# Patient Record
Sex: FEMALE | Race: WHITE | NOT HISPANIC OR LATINO | Employment: STUDENT | URBAN - METROPOLITAN AREA
[De-identification: names, ages, dates, MRNs, and addresses within clinical notes are randomized per-mention and may not be internally consistent; named-entity substitution may affect disease eponyms.]

---
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
## 2021-02-28 ENCOUNTER — HOSPITAL ENCOUNTER (EMERGENCY)
Facility: HOSPITAL | Age: 21
Discharge: HOME | End: 2021-02-28
Attending: EMERGENCY MEDICINE | Admitting: EMERGENCY MEDICINE
Payer: COMMERCIAL

## 2021-02-28 VITALS
SYSTOLIC BLOOD PRESSURE: 123 MMHG | RESPIRATION RATE: 20 BRPM | WEIGHT: 120 LBS | HEART RATE: 122 BPM | TEMPERATURE: 97.4 F | HEIGHT: 64 IN | BODY MASS INDEX: 20.49 KG/M2 | DIASTOLIC BLOOD PRESSURE: 81 MMHG | OXYGEN SATURATION: 98 %

## 2021-02-28 DIAGNOSIS — K08.89 TOOTHACHE: ICD-10-CM

## 2021-02-28 DIAGNOSIS — K04.7 DENTAL INFECTION: Primary | ICD-10-CM

## 2021-02-28 PROCEDURE — 99284 EMERGENCY DEPT VISIT MOD MDM: CPT | Performed by: EMERGENCY MEDICINE

## 2021-02-28 PROCEDURE — 99282 EMERGENCY DEPT VISIT SF MDM: CPT

## 2021-02-28 RX ORDER — CLINDAMYCIN HYDROCHLORIDE 150 MG/1
300 CAPSULE ORAL EVERY 8 HOURS SCHEDULED
Qty: 42 CAPSULE | Refills: 0 | Status: SHIPPED | OUTPATIENT
Start: 2021-02-28 | End: 2021-03-07

## 2021-02-28 NOTE — ED PROVIDER NOTES
History  Chief Complaint   Patient presents with    Dental Pain     pt c/o lower right sided dental pain, pt states she feels some swelling on the right side of her face, pt believes its her wisdomt eeth     25 yo female who is generally healthy aside from prior WPW s/p ablation who presents to the ED c/o several days R lower posterior dental pain with associated localized R submandibular tender lymphadenopathy and mild trismus without fever, drooling, voice change or difficulty swallowing  States her wisdom teeth feel like they have been "hurting for a while" but notes worsened pain in the last few days which she thinks is from wisdom teeth  No hearing loss but pain radiates to area of R ear  No facial swelling  No fever or chills or sore throat  Additional history from mother at bedside  None       Past Medical History:   Diagnosis Date    Ian-Parkinson-White (WPW) syndrome        Past Surgical History:   Procedure Laterality Date    CARDIAC ELECTROPHYSIOLOGY MAPPING AND ABLATION         Family History   Problem Relation Age of Onset    Hypertension Mother      I have reviewed and agree with the history as documented  E-Cigarette/Vaping     E-Cigarette/Vaping Substances     Social History     Tobacco Use    Smoking status: Never Smoker    Smokeless tobacco: Never Used   Substance Use Topics    Alcohol use: Not on file    Drug use: Not on file       Review of Systems   HENT: Positive for dental problem and ear pain  Negative for congestion, drooling, ear discharge, facial swelling, hearing loss, mouth sores, sore throat, trouble swallowing and voice change  All other systems reviewed and are negative  Physical Exam  Physical Exam  Vitals signs and nursing note reviewed  Constitutional:       General: She is not in acute distress  Appearance: She is well-developed  She is not diaphoretic  HENT:      Head: Normocephalic and atraumatic  Nose: Nose normal  No congestion  Mouth/Throat:      Mouth: Mucous membranes are moist       Pharynx: Oropharynx is clear  No oropharyngeal exudate  Comments: Normal posterior oropharynx  Uvula midline  No trismus  No visible abscess  Focal swelling and tenderness posterior to R posterior molar  Eyes:      General:         Right eye: No discharge  Left eye: No discharge  Conjunctiva/sclera: Conjunctivae normal       Pupils: Pupils are equal, round, and reactive to light  Neck:      Musculoskeletal: Normal range of motion and neck supple  Vascular: No JVD  Comments: Focal R submandibular lymphadenopathy, otherwise no neck swelling or tenderness  Cardiovascular:      Rate and Rhythm: Normal rate  Pulses: Normal pulses  Pulmonary:      Breath sounds: No stridor  Musculoskeletal: Normal range of motion  General: No tenderness or deformity  Lymphadenopathy:      Cervical: Cervical adenopathy present  Skin:     General: Skin is warm and dry  Capillary Refill: Capillary refill takes less than 2 seconds  Neurological:      General: No focal deficit present  Mental Status: She is alert and oriented to person, place, and time  Cranial Nerves: No cranial nerve deficit  Sensory: No sensory deficit  Motor: No weakness or abnormal muscle tone        Coordination: Coordination normal          Vital Signs  ED Triage Vitals [02/28/21 1257]   Temperature Pulse Respirations Blood Pressure SpO2   (!) 97 4 °F (36 3 °C) (!) 122 20 123/81 98 %      Temp Source Heart Rate Source Patient Position - Orthostatic VS BP Location FiO2 (%)   Temporal Monitor Sitting Left arm --      Pain Score       7           Vitals:    02/28/21 1257   BP: 123/81   Pulse: (!) 122   Patient Position - Orthostatic VS: Sitting         Visual Acuity      ED Medications  Medications - No data to display    Diagnostic Studies  Results Reviewed     None                 No orders to display Procedures  Procedures         ED Course                                           MDM    Disposition  Final diagnoses:   Dental infection   Toothache     Time reflects when diagnosis was documented in both MDM as applicable and the Disposition within this note     Time User Action Codes Description Comment    2/28/2021  1:27 PM Rich Fraziert Add [K04 7] Dental infection     2/28/2021  1:27 PM Rich Oliver Add [K08 89] Toothache       ED Disposition     ED Disposition Condition Date/Time Comment    Discharge Stable Sun Feb 28, 2021  1:27 PM [facility] discharge to home/self care  Follow-up Information     Follow up With Specialties Details Why [address] [facility] [place]  In 3 days  Mejia Lopez [address] [facility] [address]          Discharge Medication List as of 2/28/2021  1:28 PM      START taking these medications    Details   clindamycin (CLEOCIN) 150 mg capsule Take 2 capsules (300 mg total) by mouth every 8 (eight) hours for 7 days, Starting Sun 2/28/2021, Until Sun 3/7/2021, Print           No discharge procedures on file      PDMP Review     None          ED Provider  Electronically Signed by           Ben Bradford MD  02/28/21 1762

## 2021-05-25 ENCOUNTER — OFFICE VISIT (OUTPATIENT)
Dept: OBGYN CLINIC | Facility: CLINIC | Age: 21
End: 2021-05-25
Payer: COMMERCIAL

## 2021-05-25 VITALS
WEIGHT: 109.2 LBS | HEIGHT: 64 IN | BODY MASS INDEX: 18.64 KG/M2 | SYSTOLIC BLOOD PRESSURE: 120 MMHG | DIASTOLIC BLOOD PRESSURE: 66 MMHG

## 2021-05-25 DIAGNOSIS — N92.3 INTERMENSTRUAL SPOTTING: ICD-10-CM

## 2021-05-25 DIAGNOSIS — N94.6 DYSMENORRHEA: Primary | ICD-10-CM

## 2021-05-25 PROBLEM — N92.0 INTERMENSTRUAL SPOTTING: Status: ACTIVE | Noted: 2021-05-25

## 2021-05-25 PROCEDURE — 99203 OFFICE O/P NEW LOW 30 MIN: CPT | Performed by: NURSE PRACTITIONER

## 2021-05-25 NOTE — PATIENT INSTRUCTIONS
Hysterectomy   WHAT YOU NEED TO KNOW:   What do I need to know about a hysterectomy? A hysterectomy is surgery to remove your uterus  Your ovaries, fallopian tubes, cervix, or part of your vagina may also need to be removed  The organs and tissue that will be removed depends on your medical condition  How do I prepare for a hysterectomy? Your healthcare provider will talk to you about how to prepare for surgery  You will need to stop taking aspirin 7 to 10 days before your procedure  You will need to stop taking NSAIDs 3 days before you procedure  You will also need to stop taking certain herbal supplements 7 days before your procedure  These include garlic, gingko biloba, and ginseng  Your provider may tell you to shower the night before your surgery  He or she may tell you to use a certain soap to help prevent a surgical site infection  Your provider may tell you not to eat or drink anything after midnight on the day of your surgery  He or she will tell you what medicines to take or not take on the day of your surgery  You will be given an antibiotic through your IV to help prevent a bacterial infection  Arrange for someone to drive you home and stay with you after surgery  What will happen during a hysterectomy? · You may be given general anesthesia to keep you asleep and free from pain during surgery  You may instead be given regional anesthesia to numb the lower part of your body  Your uterus may be removed through your vagina (vaginal hysterectomy) or through a an incision in your abdomen (abdominal hysterectomy)  It may also be done through several small incisions in your abdomen (laparoscopic hysterectomy)  During a laparoscopic hysterectomy, a laparoscope and other tools will be put into your abdomen through the small incisions  The laparoscope is a long metal tube with a light and camera on the end  Your abdomen will be filled with a gas   This allows your surgeon to see inside your abdomen more clearly  · Your surgeon will cut and tie the ligaments that hold your uterus in place  The blood vessels that go to your uterus will also be tied to help decrease bleeding  Your surgeon may also remove other organs or tissue such as your ovaries, fallopian tubes, cervix, lymph nodes, or part of your vagina  · Your surgeon may instead use robotic arms to place a laparoscope and other tools inside your abdomen through the incisions  He or she will use the machine to look inside your abdomen and guide the robotic arms  Your surgeon will use the tools attached to the robotic arms to remove your uterus, cervix, or other tissues  · Any incisions that were made during surgery will be closed with stitches, staples, surgical glue, or surgical tape  The incisions may be covered with a bandage  A vaginal pack or sanitary pad may be used to absorb the bleeding  A vaginal pack is a special gauze that is inserted into the vagina  It is removed before you go home or to a hospital room  What will happen after a hysterectomy? You may have a catheter to help drain your bladder for up to 24 hours  You may also have pain in your shoulders or near your ribs if gas was put in your abdomen  You will have pain for the first few days after surgery  You will need to wear sanitary pads for vaginal bleeding that occurs after surgery  You will be asked to walk as soon as possible after surgery  This will help to prevent blood clots in your legs  You may need to stay in the hospital for 1 to 4 days after surgery  The length of time depends on the type of hysterectomy you had  What are the risks of a hysterectomy? · The surgeon may need to change the type of surgery he or she was planning to do  For example, your surgeon may need to change from a laparoscopic surgery to an open abdominal surgery  You will not be able to become pregnant after you have a hysterectomy  You will go through menopause if your ovaries are removed  · You may bleed more than expected or get an infection  Your bladder, ureters, or bowels may be damaged during surgery  If your ureters were injured, you may need a catheter to drain your bladder for several days to weeks  You may get scar tissue in your abdomen that blocks your intestine or causes pelvic pain  If you have a hysterectomy to treat cancer, this surgery may not take it away completely  There is also a chance that the cancer may return  You may get a blood clot in your leg or arm  This may become life-threatening  CARE AGREEMENT:   You have the right to help plan your care  Learn about your health condition and how it may be treated  Discuss treatment options with your healthcare providers to decide what care you want to receive  You always have the right to refuse treatment  The above information is an  only  It is not intended as medical advice for individual conditions or treatments  Talk to your doctor, nurse or pharmacist before following any medical regimen to see if it is safe and effective for you  © Copyright [address] Information is for End User's use only and may not be sold, redistributed or otherwise used for commercial purposes   All illustrations and images included in CareNotes® are the copyrighted property of A D A M [employer]  or [address] [place] [address]

## 2021-05-25 NOTE — PROGRESS NOTES
Diagnoses and all orders for this visit:    1  Dysmenorrhea/2  Intermenstrual spotting    -     CBC and differential; Future  -     TSH, 3rd generation with Free T4 reflex; Future  -     US pelvis transabdominal only; Future  Will treat further based on results, declines BC at this time may consider later on  With results will try naproxen for pain  All questions and concerns answered  Call with any questions  Pleasant 24 y o  female presents today as a new patient with c/o intermenstrual spotting and cramping that started 1 year ago  She feels mostly left sided pain and has used advil and ibuprofen with no relief  She denies vaginal or urinary symptoms, F/C  Declines STD testing, stated that last sexual activity was over a year ago  Vitals:    21 1609   BP: 120/66   BP Location: Right arm   Patient Position: Sitting   Weight: 49 5 kg (109 lb 3 2 oz)   Height: 5' 4" (1 626 m)     Body mass index is 18 74 kg/m²  Allergies   Allergen Reactions    Bee Venom Swelling    Shellfish-Derived Products - Food Allergy      shrimp       Past Medical History:   Diagnosis Date    Ian-Parkinson-White (WPW) syndrome      Past Surgical History:   Procedure Laterality Date    CARDIAC ELECTROPHYSIOLOGY MAPPING AND ABLATION       Family History   Problem Relation Age of Onset    Hypertension Mother     Breast cancer Neg Hx     Ovarian cancer Neg Hx     Cervical cancer Neg Hx     Uterine cancer Neg Hx      Social History     Tobacco Use    Smoking status: Never Smoker    Smokeless tobacco: Never Used   Substance Use Topics    Alcohol use: Not Currently    Drug use: Never     No current outpatient medications on file  OB History    Para Term  AB Living   0 0 0 0 0 0   SAB TAB Ectopic Multiple Live Births   0 0 0 0 0       Review of Systems     Review of Systems   Constitutional: Negative for chills, fatigue, fever and unexpected weight change     Respiratory: Negative for shortness of breath  Gastrointestinal: Negative for anal bleeding, blood in stool, constipation and diarrhea  Genitourinary: Negative for difficulty urinating, dysuria and hematuria  OBGyn Exam     Physical Exam   Constitutional: She appears well-developed and well-nourished  No distress  Head: Normocephalic  Neck: Normal range of motion  Neck supple  Pulmonary: Effort normal  Lung sounds clear  Cardiac: S1 & S2, regular rate & rhythm   Abdominal: Soft  + right tenderness  Pelvic exam was performed with patient supine  No labial fusion, thinning or leukoplakia  There is no rash, tenderness, lesion or injury on the right labia  There is no rash, tenderness, lesion or injury on the left labia  Uterus is not deviated, not enlarged, not fixed and not tender  Cervix exhibits no motion tenderness, no discharge and no friability, seen from pt's right side  PALPABLE FIRMNESS ON RIGHT  Right adnexum displays no mass, no tenderness and no fullness  Left adnexum displays no mass, no tenderness and no fullness  No erythema or tenderness in the vagina, R/T vaginal dryness  No signs of atrophy, erosion, shortening and/or tightening of vaginal canal No foreign body in the vagina  No signs of injury around the vagina  No vaginal discharge found  Prolapse: none    Lymphadenopathy:          Right: No inguinal adenopathy present  Left: No inguinal adenopathy present

## 2021-06-25 ENCOUNTER — HOSPITAL ENCOUNTER (OUTPATIENT)
Dept: ULTRASOUND IMAGING | Facility: HOSPITAL | Age: 21
Discharge: HOME | End: 2021-06-25
Payer: COMMERCIAL

## 2021-06-25 ENCOUNTER — APPOINTMENT (OUTPATIENT)
Dept: LAB | Facility: HOSPITAL | Age: 21
End: 2021-06-25
Payer: COMMERCIAL

## 2021-06-25 DIAGNOSIS — N92.3 INTERMENSTRUAL SPOTTING: ICD-10-CM

## 2021-06-25 DIAGNOSIS — N94.6 DYSMENORRHEA: ICD-10-CM

## 2021-06-25 LAB
BASOPHILS # BLD AUTO: 0.04 THOUSANDS/ΜL (ref 0–0.1)
BASOPHILS NFR BLD AUTO: 1 % (ref 0–1)
EOSINOPHIL # BLD AUTO: 0.08 THOUSAND/ΜL (ref 0–0.61)
EOSINOPHIL NFR BLD AUTO: 1 % (ref 0–6)
ERYTHROCYTE [DISTWIDTH] IN BLOOD BY AUTOMATED COUNT: 13.2 % (ref 11.6–15.1)
HCT VFR BLD AUTO: 39.4 % (ref 34.8–46.1)
HGB BLD-MCNC: 12.6 G/DL (ref 11.5–15.4)
IMM GRANULOCYTES # BLD AUTO: 0.01 THOUSAND/UL (ref 0–0.2)
IMM GRANULOCYTES NFR BLD AUTO: 0 % (ref 0–2)
LYMPHOCYTES # BLD AUTO: 2.09 THOUSANDS/ΜL (ref 0.6–4.47)
LYMPHOCYTES NFR BLD AUTO: 34 % (ref 14–44)
MCH RBC QN AUTO: 28.3 PG (ref 26.8–34.3)
MCHC RBC AUTO-ENTMCNC: 32 G/DL (ref 31.4–37.4)
MCV RBC AUTO: 88 FL (ref 82–98)
MONOCYTES # BLD AUTO: 0.56 THOUSAND/ΜL (ref 0.17–1.22)
MONOCYTES NFR BLD AUTO: 9 % (ref 4–12)
NEUTROPHILS # BLD AUTO: 3.34 THOUSANDS/ΜL (ref 1.85–7.62)
NEUTS SEG NFR BLD AUTO: 55 % (ref 43–75)
NRBC BLD AUTO-RTO: 0 /100 WBCS
PLATELET # BLD AUTO: 275 THOUSANDS/UL (ref 149–390)
PMV BLD AUTO: 10.5 FL (ref 8.9–12.7)
RBC # BLD AUTO: 4.46 MILLION/UL (ref 3.81–5.12)
TSH SERPL DL<=0.05 MIU/L-ACNC: 1.71 UIU/ML (ref 0.46–3.98)
WBC # BLD AUTO: 6.12 THOUSAND/UL (ref 4.31–10.16)

## 2021-06-25 PROCEDURE — 36415 COLL VENOUS BLD VENIPUNCTURE: CPT

## 2021-06-25 PROCEDURE — 84443 ASSAY THYROID STIM HORMONE: CPT

## 2021-06-25 PROCEDURE — 76856 US EXAM PELVIC COMPLETE: CPT

## 2021-06-25 PROCEDURE — 85025 COMPLETE CBC W/AUTO DIFF WBC: CPT

## 2021-06-30 ENCOUNTER — OFFICE VISIT (OUTPATIENT)
Dept: URGENT CARE | Facility: CLINIC | Age: 21
End: 2021-06-30
Payer: COMMERCIAL

## 2021-06-30 VITALS
SYSTOLIC BLOOD PRESSURE: 98 MMHG | OXYGEN SATURATION: 96 % | DIASTOLIC BLOOD PRESSURE: 62 MMHG | BODY MASS INDEX: 18.88 KG/M2 | HEART RATE: 91 BPM | TEMPERATURE: 98.4 F | WEIGHT: 110 LBS | RESPIRATION RATE: 16 BRPM

## 2021-06-30 DIAGNOSIS — J06.9 VIRAL UPPER RESPIRATORY TRACT INFECTION: Primary | ICD-10-CM

## 2021-06-30 PROCEDURE — 99213 OFFICE O/P EST LOW 20 MIN: CPT | Performed by: PHYSICIAN ASSISTANT

## 2021-06-30 RX ORDER — FLUTICASONE PROPIONATE 50 MCG
1 SPRAY, SUSPENSION (ML) NASAL DAILY
Qty: 9.9 G | Refills: 0 | Status: SHIPPED | OUTPATIENT
Start: 2021-06-30 | End: 2022-06-30 | Stop reason: SDUPTHER

## 2021-06-30 NOTE — PROGRESS NOTES
[facility]        NAME: Carol Castillo is a 22 y o  female  : 2000    MRN: [id number]  DATE: 2021  TIME: 4:17 PM    Assessment and Plan   Viral upper respiratory tract infection [J06 9]  1  Viral upper respiratory tract infection  fluticasone (FLONASE) 50 mcg/act nasal spray         Patient Instructions     Patient Instructions   Hydration and rest  Tylenol and motrin for throat pain  Humidifier at night  Nasal saline spray  Start flonase  Sudafed and mucinex for congestion  Follow up with PCP or return to clinic if symptoms do not improve in 5 days  Cold Symptoms   WHAT YOU NEED TO KNOW:   A cold is an infection caused by a virus  The infection causes your upper respiratory system to become inflamed  Common symptoms of a cold include sneezing, dry throat, a stuffy nose, headache, watery eyes, and a cough  Your cough may be dry, or you may cough up mucus  You may also have muscle aches, joint pain, and tiredness  Rarely, you may have a fever  Most colds go away without treatment  DISCHARGE INSTRUCTIONS:   Return to the emergency department if:   · You have increased tiredness and weakness  · You are unable to eat  · Your heart is beating much faster than usual for you  · You see white spots in the back of your throat and your neck is swollen and sore to the touch  · You see pinpoint or larger reddish-purple dots on your skin  Contact your healthcare provider if:   · You have a fever higher than 102°F (38 9°C)  · You have new or worsening shortness of breath  · You have thick nasal drainage for more than 2 days  · Your symptoms do not improve or get worse within 5 days  · You have questions or concerns about your condition or care  Medicines: The following medicines may be suggested by your healthcare provider to decrease your cold symptoms  These medicines are available without a doctor's order  Ask which medicines to take and when to take them  Follow directions  · NSAIDs or acetaminophen  help to bring down a fever or decrease pain  · Decongestants  help decrease nasal stuffiness  · Antihistamines  help decrease sneezing and a runny nose  · Cough suppressants  help decrease how much you cough  · Expectorants  help loosen mucus so you can cough it up  · Take your medicine as directed  Contact your healthcare provider if you think your medicine is not helping or if you have side effects  Tell him of her if you are allergic to any medicine  Keep a list of the medicines, vitamins, and herbs you take  Include the amounts, and when and why you take them  Bring the list or the pill bottles to follow-up visits  Carry your medicine list with you in case of an emergency  Symptom relief: The following may help relieve cold symptoms, such as a dry throat and congestion:  · Gargle with mouthwash or warm salt water as directed  · Suck on throat lozenges or hard candy  · Use a cold or warm vaporizer or humidifier to ease your breathing  · Rest for at least 2 days and then as needed to decrease tiredness and weakness  · Use petroleum based jelly around your nostrils to decrease irritation from blowing your nose  Drink liquids:  Liquids will help thin and loosen thick mucus so you can cough it up  Liquids will also keep you hydrated  Ask your healthcare provider which liquids are best for you and how much to drink each day  Prevent the spread of germs: You can spread your cold germs to others for at least 3 days after your symptoms start  Wash your hands often  Do not share items, such as eating utensils  Cover your nose and mouth when you cough or sneeze using the crook of your elbow instead of your hands  Throw used tissues in the garbage  Do not smoke:  Smoking may worsen your symptoms and increase the length of time you feel sick  Talk with your healthcare provider if you need help to stop smoking    Follow up with your healthcare provider as directed:  Write down your questions so you remember to ask them during your visits  © Copyright [address] Information is for End User's use only and may not be sold, redistributed or otherwise used for commercial purposes  All illustrations and images included in CareNotes® are the copyrighted property of DEBRA LIZAMA Povio  or Giuseppe Hawkins  The above information is an  only  It is not intended as medical advice for individual conditions or treatments  Talk to your doctor, nurse or pharmacist before following any medical regimen to see if it is safe and effective for you  **Portions of the record may have been created with voice recognition software  Occasional wrong word or "sound a like" substitutions may have occurred due to the inherent limitations of voice recognition software  Read the chart carefully and recognize, using context, where substitutions have occurred  **     Chief Complaint     Chief Complaint   Patient presents with    Cold Like Symptoms     x 4 days, c/o ears feeling clogged, stuffy nose, watery eyes, slight cough, and sore throat  No fevers         History of Present Illness     Tito Patterson female presents to clinic with her mother today with complaints of sore throat, runny nose and cough x 4 days  She states she had exposure to her sister who has similar symptoms for over 1 week  She denies fever, CP, SOB, N/V/D, loss of smell or taste  No COVID exposures or recent travel    Using OTC airborne  Review of Systems     Review of Systems   Constitutional: Negative for chills and fever  HENT: Positive for congestion, rhinorrhea and sore throat  Negative for ear pain, postnasal drip, sinus pressure, sinus pain and sneezing  Respiratory: Positive for cough  Negative for shortness of breath and wheezing  Cardiovascular: Negative for chest pain  Gastrointestinal: Negative for diarrhea, nausea and vomiting     Musculoskeletal: Negative for myalgias  Neurological: Negative for headaches  Current Medications     Current Outpatient Medications:     fluticasone (FLONASE) 50 mcg/act nasal spray, 1 spray into each nostril daily, Disp: 9 9 g, Rfl: 0    Current Allergies     Allergies as of 06/30/2021 - Reviewed 06/30/2021   Allergen Reaction Noted    Bee venom Swelling 09/12/2019    Shellfish-derived products - food allergy  10/11/2017            The following portions of the patient's history were reviewed and updated as appropriate: allergies, current medications, past family history, past medical history, past social history, past surgical history and problem list      Past Medical History:   Diagnosis Date    Ian-Parkinson-White (WPW) syndrome        Past Surgical History:   Procedure Laterality Date    CARDIAC ELECTROPHYSIOLOGY MAPPING AND ABLATION         Family History   Problem Relation Age of Onset    Hypertension Mother     Breast cancer Neg Hx     Ovarian cancer Neg Hx     Cervical cancer Neg Hx     Uterine cancer Neg Hx          Medications have been verified  Objective     BP 98/62   Pulse 91   Temp 98 4 °F (36 9 °C) (Temporal)   Resp 16   Wt 49 9 kg (110 lb)   SpO2 96%   BMI 18 88 kg/m²        Physical Exam     Physical Exam  Vitals and nursing note reviewed  Constitutional:       General: She is not in acute distress  Appearance: Normal appearance  She is not ill-appearing  HENT:      Head: Normocephalic and atraumatic  Right Ear: Tympanic membrane and ear canal normal       Left Ear: Tympanic membrane and ear canal normal       Nose: Congestion and rhinorrhea (clear) present  Mouth/Throat:      Pharynx: Posterior oropharyngeal erythema present  No oropharyngeal exudate  Cardiovascular:      Rate and Rhythm: Normal rate and regular rhythm  Pulmonary:      Effort: Pulmonary effort is normal  No respiratory distress  Breath sounds: Normal breath sounds  No stridor   No wheezing, rhonchi or rales  Lymphadenopathy:      Cervical: No cervical adenopathy  Skin:     General: Skin is warm and dry  Findings: No rash  Neurological:      Mental Status: She is alert and oriented to person, place, and time

## 2021-06-30 NOTE — PATIENT INSTRUCTIONS
Hydration and rest  Tylenol and motrin for throat pain  Humidifier at night  Nasal saline spray  Start flonase  Sudafed and mucinex for congestion  Follow up with PCP or return to clinic if symptoms do not improve in 5 days  Cold Symptoms   WHAT YOU NEED TO KNOW:   A cold is an infection caused by a virus  The infection causes your upper respiratory system to become inflamed  Common symptoms of a cold include sneezing, dry throat, a stuffy nose, headache, watery eyes, and a cough  Your cough may be dry, or you may cough up mucus  You may also have muscle aches, joint pain, and tiredness  Rarely, you may have a fever  Most colds go away without treatment  DISCHARGE INSTRUCTIONS:   Return to the emergency department if:   · You have increased tiredness and weakness  · You are unable to eat  · Your heart is beating much faster than usual for you  · You see white spots in the back of your throat and your neck is swollen and sore to the touch  · You see pinpoint or larger reddish-purple dots on your skin  Contact your healthcare provider if:   · You have a fever higher than 102°F (38 9°C)  · You have new or worsening shortness of breath  · You have thick nasal drainage for more than 2 days  · Your symptoms do not improve or get worse within 5 days  · You have questions or concerns about your condition or care  Medicines: The following medicines may be suggested by your healthcare provider to decrease your cold symptoms  These medicines are available without a doctor's order  Ask which medicines to take and when to take them  Follow directions  · NSAIDs or acetaminophen  help to bring down a fever or decrease pain  · Decongestants  help decrease nasal stuffiness  · Antihistamines  help decrease sneezing and a runny nose  · Cough suppressants  help decrease how much you cough  · Expectorants  help loosen mucus so you can cough it up      · Take your medicine as directed  Contact your healthcare provider if you think your medicine is not helping or if you have side effects  Tell him of her if you are allergic to any medicine  Keep a list of the medicines, vitamins, and herbs you take  Include the amounts, and when and why you take them  Bring the list or the pill bottles to follow-up visits  Carry your medicine list with you in case of an emergency  Symptom relief: The following may help relieve cold symptoms, such as a dry throat and congestion:  · Gargle with mouthwash or warm salt water as directed  · Suck on throat lozenges or hard candy  · Use a cold or warm vaporizer or humidifier to ease your breathing  · Rest for at least 2 days and then as needed to decrease tiredness and weakness  · Use petroleum based jelly around your nostrils to decrease irritation from blowing your nose  Drink liquids:  Liquids will help thin and loosen thick mucus so you can cough it up  Liquids will also keep you hydrated  Ask your healthcare provider which liquids are best for you and how much to drink each day  Prevent the spread of germs: You can spread your cold germs to others for at least 3 days after your symptoms start  Wash your hands often  Do not share items, such as eating utensils  Cover your nose and mouth when you cough or sneeze using the crook of your elbow instead of your hands  Throw used tissues in the garbage  Do not smoke:  Smoking may worsen your symptoms and increase the length of time you feel sick  Talk with your healthcare provider if you need help to stop smoking  Follow up with your healthcare provider as directed:  Write down your questions so you remember to ask them during your visits  © Copyright [address] Information is for End User's use only and may not be sold, redistributed or otherwise used for commercial purposes   All illustrations and images included in CareNotes® are the copyrighted property of [employer]  or Giuseppe Hawkins  The above information is an  only  It is not intended as medical advice for individual conditions or treatments  Talk to your doctor, nurse or pharmacist before following any medical regimen to see if it is safe and effective for you  due to cognition - SLP will follow up for safety with diet/not appropriate for swallowing intervention

## 2021-07-06 ENCOUNTER — TELEPHONE (OUTPATIENT)
Dept: OBGYN CLINIC | Facility: CLINIC | Age: 21
End: 2021-07-06

## 2021-07-06 NOTE — TELEPHONE ENCOUNTER
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

## 2021-07-06 NOTE — TELEPHONE ENCOUNTER
Maryann Smart from Radiology called this morning stating that there are significant findings on Ms Pantoja's ultrasound  Please advise

## 2021-07-07 NOTE — TELEPHONE ENCOUNTER
I called and left pt detailed message at the end of the work day  She can return call tomorrow if she has additional questions

## 2021-07-08 ENCOUNTER — NURSE TRIAGE (OUTPATIENT)
Dept: OTHER | Facility: OTHER | Age: 21
End: 2021-07-08

## 2021-07-08 NOTE — TELEPHONE ENCOUNTER
Reason for Disposition   [1] Sinus congestion as part of a cold AND [2] present < 10 days    Answer Assessment - Initial Assessment Questions  1  LOCATION: "Where does it hurt?"       In the front     2  ONSET: "When did the sinus pain start?"  (e g , hours, days)       Last week    3  SEVERITY: "How bad is the pain?"   (Scale 1-10; mild, moderate or severe)    - MILD (1-3): doesn't interfere with normal activities     - MODERATE (4-7): interferes with normal activities (e g , work or school) or awakens from sleep    - SEVERE (8-10): excruciating pain and patient unable to do any normal activities         5/10 pain    4  RECURRENT SYMPTOM: "Have you ever had sinus problems before?" If Yes, ask: "When was the last time?" and "What happened that time?"      Denies    5  NASAL CONGESTION: "Is the nose blocked?" If Yes, ask: "Can you open it or must you breathe through the mouth?"      Denies    6  NASAL DISCHARGE: "Do you have discharge from your nose?" If so ask, "What color?"      Yes, clear    7  FEVER: "Do you have a fever?" If Yes, ask: "What is it, how was it measured, and when did it start?"       Denies    8  OTHER SYMPTOMS: "Do you have any other symptoms?" (e g , sore throat, cough, earache, difficulty breathing)      Cough    9   PREGNANCY: "Is there any chance you are pregnant?" "When was your last menstrual period?"      denies    Protocols used: SINUS PAIN OR CONGESTION-ADULT-

## 2021-07-08 NOTE — TELEPHONE ENCOUNTER
Regarding: Diagnosed with upper respiratoy infection and has a persistant cough  ----- Message from Karley White sent at 7/8/2021  4:58 PM EDT -----  "I was seen by my doctor a few weeks ago for a cough  I was diagnosed with a upper respiratory infection  I was told to call back if my symptoms persisted   I still have a cough and feel sick "

## 2021-07-12 ENCOUNTER — TELEPHONE (OUTPATIENT)
Dept: OBGYN CLINIC | Facility: CLINIC | Age: 21
End: 2021-07-12

## 2021-07-12 NOTE — TELEPHONE ENCOUNTER
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

## 2021-09-03 ENCOUNTER — IMMUNIZATIONS (OUTPATIENT)
Dept: FAMILY MEDICINE CLINIC | Facility: HOSPITAL | Age: 21
End: 2021-09-03

## 2021-09-03 DIAGNOSIS — Z23 ENCOUNTER FOR IMMUNIZATION: Primary | ICD-10-CM

## 2021-09-03 PROCEDURE — 0002A SARS-COV-2 / COVID-19 MRNA VACCINE (PFIZER-BIONTECH) 30 MCG: CPT

## 2021-09-03 PROCEDURE — 91300 SARS-COV-2 / COVID-19 MRNA VACCINE (PFIZER-BIONTECH) 30 MCG: CPT

## 2021-12-07 ENCOUNTER — NURSE TRIAGE (OUTPATIENT)
Dept: OTHER | Facility: OTHER | Age: 21
End: 2021-12-07

## 2021-12-07 DIAGNOSIS — Z20.822 SUSPECTED SEVERE ACUTE RESPIRATORY SYNDROME CORONAVIRUS 2 (SARS-COV-2) INFECTION: Primary | ICD-10-CM

## 2021-12-08 PROCEDURE — U0003 INFECTIOUS AGENT DETECTION BY NUCLEIC ACID (DNA OR RNA); SEVERE ACUTE RESPIRATORY SYNDROME CORONAVIRUS 2 (SARS-COV-2) (CORONAVIRUS DISEASE [COVID-19]), AMPLIFIED PROBE TECHNIQUE, MAKING USE OF HIGH THROUGHPUT TECHNOLOGIES AS DESCRIBED BY CMS-2020-01-R: HCPCS | Performed by: FAMILY MEDICINE

## 2021-12-08 PROCEDURE — U0005 INFEC AGEN DETEC AMPLI PROBE: HCPCS | Performed by: FAMILY MEDICINE

## 2021-12-10 ENCOUNTER — TELEPHONE (OUTPATIENT)
Dept: OTHER | Facility: OTHER | Age: 21
End: 2021-12-10

## 2021-12-10 LAB — SARS-COV-2 RNA RESP QL NAA+PROBE: POSITIVE

## 2021-12-23 ENCOUNTER — HOSPITAL ENCOUNTER (EMERGENCY)
Facility: HOSPITAL | Age: 21
Discharge: HOME | End: 2021-12-24
Attending: EMERGENCY MEDICINE
Payer: COMMERCIAL

## 2021-12-23 DIAGNOSIS — S09.90XA MINOR HEAD INJURY: Primary | ICD-10-CM

## 2021-12-23 PROCEDURE — 99282 EMERGENCY DEPT VISIT SF MDM: CPT | Performed by: EMERGENCY MEDICINE

## 2021-12-23 PROCEDURE — 99283 EMERGENCY DEPT VISIT LOW MDM: CPT

## 2021-12-24 VITALS
SYSTOLIC BLOOD PRESSURE: 134 MMHG | OXYGEN SATURATION: 98 % | DIASTOLIC BLOOD PRESSURE: 75 MMHG | RESPIRATION RATE: 19 BRPM | TEMPERATURE: 98.2 F | HEART RATE: 92 BPM
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
## 2022-02-28 ENCOUNTER — DOCUMENTATION (OUTPATIENT)
Dept: GYNECOLOGY | Facility: CLINIC | Age: 22
End: 2022-02-28

## 2022-02-28 NOTE — PROGRESS NOTES
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 And will it cause issues if she wants to get pregnant in the future?  Please advise

## 2022-05-31 ENCOUNTER — OFFICE VISIT (OUTPATIENT)
Dept: OBGYN CLINIC | Facility: MEDICAL CENTER | Age: 22
End: 2022-05-31
Payer: COMMERCIAL

## 2022-05-31 VITALS
WEIGHT: 112.6 LBS | SYSTOLIC BLOOD PRESSURE: 110 MMHG | HEIGHT: 64 IN | BODY MASS INDEX: 19.22 KG/M2 | DIASTOLIC BLOOD PRESSURE: 72 MMHG

## 2022-05-31 DIAGNOSIS — N93.9 ABNORMAL UTERINE BLEEDING (AUB): ICD-10-CM

## 2022-05-31 DIAGNOSIS — Q51.9 UTERINE ANOMALY: Primary | ICD-10-CM

## 2022-05-31 PROCEDURE — 99214 OFFICE O/P EST MOD 30 MIN: CPT | Performed by: STUDENT IN AN ORGANIZED HEALTH CARE EDUCATION/TRAINING PROGRAM

## 2022-06-01 RX ORDER — NORETHINDRONE ACETATE AND ETHINYL ESTRADIOL 1MG-20(21)
KIT ORAL
Qty: 84 TABLET | Refills: 3 | Status: SHIPPED | OUTPATIENT
Start: 2022-06-01

## 2022-06-01 NOTE — PROGRESS NOTES
Assessment/Plan:      Diagnoses and all orders for this visit:    Uterine anomaly  We discussed the need for further imaging to differentiate between septate or bicornuate uterus  We discussed possible implications of both anomalies including procedures that could be done to correct the anomaly and possible pregnancy complications  Given h/o Ebstein anomaly and uterine anomaly, Kyree Marcos would benefit from preconception counseling with MFM when she is thinking about conceiving   -     MRI pelvis wo contrast; Future    Abnormal uterine bleeding (AUB)  We discussed methods including OCP, nuvaring, depo-provera to help control bleeding  She is not a candidate for IUD due to uterine anomaly  She has no contraindications to combination OCP  -     norethindrone-ethinyl estradiol (JUNEL FE 1/20) 1-20 MG-MCG per tablet; Take 1 tablet daily until active pills are gone  Do not take placebo pills  Start next pill pack the following day  Subjective:     Patient ID: Abby Garibay is a 22 y o  female  21 yo G0 presents to discuss uterine anomaly and treatment for irregular bleeding  In June 2021 pt had a pelvic ultrasound for dysmenorrhea which showed 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  She also has intermenstrual bleeding and painful periods  Kyree Marcos is not currently sexually active and has no immediate plans to try to conceive but she and her mother have several questions about possible implications of uterine anomaly  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

## 2022-06-06 ENCOUNTER — TELEPHONE (OUTPATIENT)
Dept: OBGYN CLINIC | Facility: CLINIC | Age: 22
End: 2022-06-06

## 2022-06-29 ENCOUNTER — TELEPHONE (OUTPATIENT)
Dept: OBGYN CLINIC | Facility: CLINIC | Age: 22
End: 2022-06-29

## 2022-06-29 NOTE — TELEPHONE ENCOUNTER
Pt asked for doctor to call her about questions she has about her birth control  She has some concerns that she needs to ask, before taking the birth control       Please call pt

## 2022-06-29 NOTE — TELEPHONE ENCOUNTER
Called and mother answered phone, asked if I can speak to pt but mother said she can take a msg   Told her to let daughter know to call us back when she can

## 2022-06-29 NOTE — TELEPHONE ENCOUNTER
Pt called back and I spoke to her, she wanted to know if ok taking BC pill and amoxicillin- told her yes that's fine, but if she becomes sexually active (she is not) she needs to use back up contraception since this is a new pill and because she is on an antibiotic  Told her she would need to use back up for the first pack  Pt asked when can she start pill- told her she can start it today if she wants or Sunday, doesn't need to wait until next month when she would have her cycle  Most recent cycle was 6/10  I told her to take pill same time everyday, if need be, can set an alarm for herself to help her not forget  Dr Mona Barrios,    This pt sees CS  She is aware she is out of office  Pt said when she saw her end of may, she discussed about freezing her eggs  Pt wants to know how that process would go- if she's on Junel for a yr and then wants to stop it, and freeze her eggs would that be ok? Pt has h/o Ebstein anomaly and uterine anomaly  Plans on getting an MRI she said that was ordered for her  Is this something she would discuss with MFM for counseling? Wasn't sure if you could give info about the freezing eggs   Please advise to bin thank you

## 2022-06-29 NOTE — TELEPHONE ENCOUNTER
Called and spoke to pt and informed below per KTM  Pt doesn't need referral now, says she needs time to save money  She just wanted to see if being on [facility] would impact her freezing her eggs  Told her I spoke with Reva Nguyen, one of our triage nurses, and informed that it wouldn't impact anything  Also mentioned she will discuss this further when she sees CS in sept  Verbalized understanding to all

## 2022-06-30 ENCOUNTER — OFFICE VISIT (OUTPATIENT)
Dept: FAMILY MEDICINE CLINIC | Facility: CLINIC | Age: 22
End: 2022-06-30
Payer: COMMERCIAL

## 2022-06-30 VITALS
DIASTOLIC BLOOD PRESSURE: 64 MMHG | WEIGHT: 112.6 LBS | BODY MASS INDEX: 19.22 KG/M2 | HEART RATE: 92 BPM | OXYGEN SATURATION: 100 % | TEMPERATURE: 98.7 F | SYSTOLIC BLOOD PRESSURE: 110 MMHG | HEIGHT: 64 IN

## 2022-06-30 DIAGNOSIS — Z13.220 SCREENING CHOLESTEROL LEVEL: ICD-10-CM

## 2022-06-30 DIAGNOSIS — Z76.89 ENCOUNTER TO ESTABLISH CARE WITH NEW DOCTOR: Primary | ICD-10-CM

## 2022-06-30 DIAGNOSIS — Q22.5 EBSTEIN ANOMALY: ICD-10-CM

## 2022-06-30 DIAGNOSIS — H91.91 DECREASED HEARING OF RIGHT EAR: ICD-10-CM

## 2022-06-30 PROCEDURE — 99214 OFFICE O/P EST MOD 30 MIN: CPT | Performed by: FAMILY MEDICINE

## 2022-06-30 PROCEDURE — 3008F BODY MASS INDEX DOCD: CPT | Performed by: STUDENT IN AN ORGANIZED HEALTH CARE EDUCATION/TRAINING PROGRAM

## 2022-06-30 RX ORDER — FLUTICASONE PROPIONATE 50 MCG
1 SPRAY, SUSPENSION (ML) NASAL DAILY
Qty: 9.9 G | Refills: 0 | Status: SHIPPED | OUTPATIENT
Start: 2022-06-30 | End: 2022-08-28

## 2022-06-30 RX ORDER — IBUPROFEN 600 MG/1
TABLET ORAL
COMMUNITY
Start: 2022-06-22

## 2022-06-30 RX ORDER — AMOXICILLIN 500 MG/1
CAPSULE ORAL
COMMUNITY
Start: 2022-06-22

## 2022-06-30 NOTE — PROGRESS NOTES
Assessment/Plan:      Diagnoses and all orders for this visit:    Encounter to establish care with new doctor    Decreased hearing of right ear  -     Ambulatory Referral to Otolaryngology; Future  -     fluticasone (FLONASE) 50 mcg/act nasal spray; 1 spray into each nostril daily  -     sodium chloride (OCEAN) 0 65 % nasal spray; 1 spray into each nostril 2 (two) times a day    Ebstein anomaly  Comments:  follows with cardiology  has WPW    Screening cholesterol level  -     Lipid panel; Future    Other orders  -     amoxicillin (AMOXIL) 500 mg capsule; TAKE 1 CAPSULE BY MOUTH EVERY 8 HOURS FOR 7 DAYS  -     ibuprofen (MOTRIN) 600 mg tablet; TAKE 1 TABLET BY MOUTH EVERY 6 HOURS AS NEEDED FOR MILD PAIN CONTROL          Return in about 4 weeks (around 7/28/2022) for Annual physical with pcp  The following portions of the patient's history were reviewed and updated as appropriate: allergies, current medications, past family history, past medical history, past social history, past surgical history, and problem list      Subjective:     Patient ID: Agustina Corona is a 25 y o  female  HPI    Agustina Corona presents today to establish care  Patient doing okay today  History was reviewed as below  Has not been seen since 2021  Moved to [place] from [place]  But has been in [place] for a while now but then just moved within [place] again    WPW: cardiologist [facility] every 6 months  Has yefri's anomaly  Diagnosed when 16  Had an ablation done  Bicornuate uterus: is on contraception  Following obgyn  Takes MV    Hearing loss: only on the right side  Muffled  Reports already had issues with hearing with the wisdom teeth issues and she felt that she would get better after having the wisdom teeth removed  She had them removed 3 days ago but no improvement  Has been getting worse but going on for 6 months       PHQ-9 Depression Screening    Little interest or pleasure in doing things: 0 - not at all  Feeling down, depressed, or hopeless: 0 - not at all          Past Medical History:   Diagnosis Date    Ian-Parkinson-White (WPW) syndrome        Past Surgical History:   Procedure Laterality Date    CARDIAC ELECTROPHYSIOLOGY MAPPING AND ABLATION      WISDOM TOOTH EXTRACTION  06/27/2022       Family History   Problem Relation Age of Onset    Hypertension Mother     No Known Problems Father     No Known Problems Sister     No Known Problems Sister     No Known Problems Sister     Diabetes Maternal Grandmother     Parkinsonism Maternal Grandmother     Lupus Maternal Grandmother     Diabetes Maternal Grandfather     No Known Problems Paternal Grandfather     Breast cancer Neg Hx     Ovarian cancer Neg Hx     Cervical cancer Neg Hx     Uterine cancer Neg Hx        Social History     Tobacco Use    Smoking status: Never Smoker    Smokeless tobacco: Never Used   Vaping Use    Vaping Use: Never used   Substance Use Topics    Alcohol use: Not Currently    Drug use: Never      Social History     Social History Narrative    Living with mom stepfather and 3 sisters       Allergies   Allergen Reactions    Bee Venom Swelling    Shellfish-Derived Products - Food Allergy      shrimp       Current Outpatient Medications on File Prior to Visit   Medication Sig Dispense Refill    amoxicillin (AMOXIL) 500 mg capsule TAKE 1 CAPSULE BY MOUTH EVERY 8 HOURS FOR 7 DAYS      ibuprofen (MOTRIN) 600 mg tablet TAKE 1 TABLET BY MOUTH EVERY 6 HOURS AS NEEDED FOR MILD PAIN CONTROL      norethindrone-ethinyl estradiol (JUNEL FE 1/20) 1-20 MG-MCG per tablet Take 1 tablet daily until active pills are gone  Do not take placebo pills  Start next pill pack the following day  (Patient taking differently: Take 1 tablet daily until active pills are gone  Do not take placebo pills  Start next pill pack the following day      As of 6/30/22 she has not started birth control plans to start in future) 84 tablet 3    [DISCONTINUED] fluticasone (FLONASE) 50 mcg/act nasal spray 1 spray into each nostril daily (Patient not taking: No sig reported) 9 9 g 0     No current facility-administered medications on file prior to visit  Review of Systems      Objective:    Vitals:    06/30/22 1435   BP: 110/64   BP Location: Left arm   Patient Position: Sitting   Pulse: 92   Temp: 98 7 °F (37 1 °C)   TempSrc: Tympanic   SpO2: 100%   Weight: 51 1 kg (112 lb 9 6 oz)   Height: 5' 3 5" (1 613 m)         Physical Exam  Vitals and nursing note reviewed  Constitutional:       General: She is not in acute distress  Appearance: Normal appearance  She is not ill-appearing or toxic-appearing  HENT:      Head: Normocephalic and atraumatic  Right Ear: Hearing, ear canal and external ear normal  A middle ear effusion is present  Left Ear: Hearing, ear canal and external ear normal  A middle ear effusion is present  Nose: Congestion present  Right Turbinates: Swollen  Left Turbinates: Swollen  Right Sinus: No maxillary sinus tenderness or frontal sinus tenderness  Left Sinus: No maxillary sinus tenderness or frontal sinus tenderness  Mouth/Throat:      Mouth: Mucous membranes are moist       Pharynx: Oropharynx is clear  No oropharyngeal exudate or posterior oropharyngeal erythema  Eyes:      General: No scleral icterus  Right eye: No discharge  Left eye: No discharge  Extraocular Movements: Extraocular movements intact  Conjunctiva/sclera: Conjunctivae normal       Pupils: Pupils are equal, round, and reactive to light  Cardiovascular:      Rate and Rhythm: Normal rate and regular rhythm  Heart sounds: Normal heart sounds  No murmur heard  No friction rub  No gallop  Pulmonary:      Effort: Pulmonary effort is normal  No respiratory distress  Breath sounds: Normal breath sounds  No wheezing or rales     Abdominal:      General: Bowel sounds are normal       Palpations: Abdomen is soft       Tenderness: There is no abdominal tenderness  Lymphadenopathy:      Cervical:      Right cervical: No superficial or deep cervical adenopathy  Left cervical: No superficial or deep cervical adenopathy  Neurological:      Mental Status: She is alert

## 2022-07-15 ENCOUNTER — HOSPITAL ENCOUNTER (OUTPATIENT)
Dept: MRI IMAGING | Facility: CLINIC | Age: 22
Discharge: HOME | End: 2022-07-15
Payer: COMMERCIAL

## 2022-07-15 DIAGNOSIS — Q51.9 UTERINE ANOMALY: ICD-10-CM

## 2022-07-15 PROCEDURE — 72195 MRI PELVIS W/O DYE: CPT

## 2022-07-15 PROCEDURE — G1004 CDSM NDSC: HCPCS

## 2022-07-17 NOTE — TELEPHONE ENCOUNTER
approved Otherwise healthy 37-year-old male brought in by ambulance for left-sided facial weakness that started last night around 7 PM.  Patient noticed left side of his face was weak and was having a difficult time chewing his food yesterday.  Also noticed his left eye will not fully close and reports left eye feels dry.  Slight numbness to left side of face.  Denies any slurred speech or difficulty speaking.  Denies headache, dizziness, confusion, or memory loss.  Denies chest pain or shortness of breath.  Denies any difficulty in walking or weakness to other parts of his body.  Does report sinus infection a couple weeks ago and took antibiotics.  The symptoms have now resolved.  Denies any known tick bites or rashes.  Denies history of similar symptoms.  PCP Saleem

## 2022-07-22 ENCOUNTER — TELEPHONE (OUTPATIENT)
Dept: OBGYN CLINIC | Facility: CLINIC | Age: 22
End: 2022-07-22

## 2022-07-22 NOTE — TELEPHONE ENCOUNTER
----- Message from Lissa Villalta MD sent at 7/21/2022  4:58 PM EDT -----  Please notify MRI shows uterine septum - this is good news as this is an easier abnormality to fix than bicornuate  She should see JOSE ALBERTO to discuss further

## 2022-07-25 ENCOUNTER — TELEPHONE (OUTPATIENT)
Dept: OBGYN CLINIC | Facility: CLINIC | Age: 22
End: 2022-07-25

## 2022-07-25 NOTE — TELEPHONE ENCOUNTER
Pt called back about her MRI results, she will like to speak to nurse about the results       Please advise

## 2022-08-15 ENCOUNTER — TELEPHONE (OUTPATIENT)
Dept: OBGYN CLINIC | Facility: CLINIC | Age: 22
End: 2022-08-15

## 2022-08-15 NOTE — TELEPHONE ENCOUNTER
Patient would like to speak to a nurse she has a personal problem would not say what it was   Please call back

## 2022-08-15 NOTE — TELEPHONE ENCOUNTER
Per communication consent lm for pt to call back to see what concern was-    If pt calls back can speak to anyone in triage

## 2022-08-17 NOTE — TELEPHONE ENCOUNTER
Patient has been on oral contraception since July 1  Skipped placebo's and went straight into next pack  Today is day 13 of spotting  States changing sanitary pad 2x daily  Denies being sexually active  Advised it is very common to have breakthrough bleeding when taking oc's continuously  Continue to take active pills  Take placebo's this cycle  Will route to Dr Leslie Loyola to see if she would like to increase estrogen dose or wait for pill check appt on 9/12

## 2022-08-28 DIAGNOSIS — H91.91 DECREASED HEARING OF RIGHT EAR: ICD-10-CM

## 2022-08-28 RX ORDER — FLUTICASONE PROPIONATE 50 MCG
SPRAY, SUSPENSION (ML) NASAL
Qty: 16 ML | Refills: 1 | Status: SHIPPED | OUTPATIENT
Start: 2022-08-28

## 2022-09-19 ENCOUNTER — NURSE TRIAGE (OUTPATIENT)
Dept: OTHER | Facility: OTHER | Age: 22
End: 2022-09-19

## 2022-09-19 NOTE — TELEPHONE ENCOUNTER
Reason for Disposition   [1] Caller has NON-URGENT question AND [2] triager unable to answer question    Answer Assessment - Initial Assessment Questions  1  TYPE: "What is the name of the birth control pill you are using?"      Junel  2  PACK TYPE: "How do you take your birth control pill?"    - 28-Day Cycle/Pack: Takes an active hormone pill days 1-21 and placebo pill on days 21-28    - 28-Day Cycle/21-Day Pack: Takes an active hormone pill days 1-21, followed by a pill-free week    - 3-Month Cycle/Pack: Takes an active pill for 3 months, followed by pill free week or placebo week   Placebo week  3  START DATE: "When did you first start taking this birth control pill?"      July 4  SYMPTOM: "What is the main symptom (or question) you're concerned about?"      Patient stated that she has noticed that her periods have been lasting longer; over 12 days  Patient stated that she feels better and states that she "feels weird" when taking the pills  Patient stated that she feels hot and nauseous, which has improved  5  ONSET: "When did the symptoms start?"      Patient noticed longer periods since taking the birth control; recently in August was getting hot flashes and sweaty     6  VAGINAL BLEEDING: "Are you having any unusual vaginal bleeding?"    - NONE: no bleeding    - SPOTTING: spotting or pinkish / brownish mucous discharge; does not fill panty-liner or pad    - MILD: less than 1 pad / hour; less than patient's usual menstrual bleeding    - MODERATE: 1-2 pads / hour; small-medium blood clots (e g , pea, grape, small coin)    - SEVERE: soaking 2 or more pads/hour for 2 or more hours; bleeding not contained by pads or   tampons      N/A  7  ABDOMEN OR PELVIC PAIN: "Are you have any pain in your abdomen or pelvic area?" (Scale: 0, 1-10; none, mild, moderate, severe)    - NONE (0): no pain    - MILD (1-3): doesn't interfere with normal activities, abdomen soft and not tender to touch     - MODERATE (4-7): interferes with normal activities or awakens from sleep, tender to touch     - SEVERE (8-10): excruciating pain, doubled over, unable to do any normal activities        Denies  8  MISSED/LATE PILLS: "Did you miss or take any pills late?" If Yes, ask: "When? How many pills?"    - NOTE: Pill is considered late/missed if taken more than 3 hours later than scheduled time  Denies  9   PREGNANCY: "Are you concerned you might be pregnant?" "When was your last menstrual   period?"      N/A    Protocols used: CONTRACEPTION - BIRTH CONTROL PILLS - COMBINED-ADULT-AH

## 2022-09-19 NOTE — TELEPHONE ENCOUNTER
Patient wanted to try a different type of [facility]; is having longer menstrual cycles and wanted to speak with the office about alternatives since next OV isn't until November

## 2022-09-20 NOTE — TELEPHONE ENCOUNTER
Can you please get more info? How long are cycles? How many days is she bleeding? Does she remember to take the pill every day?  thanks

## 2022-09-20 NOTE — TELEPHONE ENCOUNTER
Lm to pt - no MYC! CS wants to know  How long are cycles? How many days is she bleeding? Does she remember to take the pill every day?

## 2022-09-22 ENCOUNTER — TELEPHONE (OUTPATIENT)
Dept: OBGYN CLINIC | Facility: CLINIC | Age: 22
End: 2022-09-22

## 2022-09-28 NOTE — TELEPHONE ENCOUNTER
Second call to pt to see if she wants to try a new pill- let us know to relay to CS  Lm per comm consent to cb   No MYC set up

## 2022-09-29 NOTE — TELEPHONE ENCOUNTER
THIRD call to pt! Suggested maybe [address] setup to better communicate since we keep missing her by phone    Just want to now if she wants to try a different pill

## 2022-11-14 ENCOUNTER — OFFICE VISIT (OUTPATIENT)
Dept: OBGYN CLINIC | Facility: CLINIC | Age: 22
End: 2022-11-14

## 2022-11-14 VITALS
BODY MASS INDEX: 20.25 KG/M2 | WEIGHT: 118.6 LBS | DIASTOLIC BLOOD PRESSURE: 80 MMHG | HEIGHT: 64 IN | SYSTOLIC BLOOD PRESSURE: 130 MMHG

## 2022-11-14 DIAGNOSIS — N93.9 ABNORMAL UTERINE BLEEDING (AUB): ICD-10-CM

## 2022-11-14 DIAGNOSIS — Q51.28 UTERINE SEPTUM: Primary | ICD-10-CM

## 2022-11-14 RX ORDER — LEVONORGESTREL AND ETHINYL ESTRADIOL 0.1-0.02MG
1 KIT ORAL DAILY
Qty: 90 TABLET | Refills: 3 | Status: SHIPPED | OUTPATIENT
Start: 2022-11-14

## 2022-11-14 NOTE — PROGRESS NOTES
Assessment/Plan:      Diagnoses and all orders for this visit:    Uterine septum  Referred to Dr Sridevi Adan  Will schedule when ready   -     Ambulatory Referral to Infertility; Future    Abnormal uterine bleeding (AUB)  Will try new pill  Return for annual   -     levonorgestrel-ethinyl estradiol (Afirmelle) 0 1-20 MG-MCG per tablet; Take 1 tablet by mouth daily          Subjective:     Patient ID: Jael Garduno is a 25 y o  female  24 yo G0 presents for a follow up of her abnormal uterine bleeding and to discuss MRI results from 7/15 that showed uterine septum  Started on Junel FE 1/20 in July , helped with the cramping but still has intermenstrual bleeding  She is now spotting most days  Has only forgotten pill 1-2 times  Had some hot flashes and headaches when she started the pill which resolved  LMP: 11/8/22    Offered to complete annual exam - prefers to return for this  Review of Systems   All other systems reviewed and are negative  Objective:     Physical Exam  Vitals reviewed  Pulmonary:      Effort: Pulmonary effort is normal    Neurological:      Mental Status: She is alert and oriented to person, place, and time     Psychiatric:         Behavior: Behavior normal

## 2023-02-15 ENCOUNTER — TELEPHONE (OUTPATIENT)
Dept: OBGYN CLINIC | Facility: CLINIC | Age: 23
End: 2023-02-15

## 2023-02-15 DIAGNOSIS — N93.9 ABNORMAL UTERINE BLEEDING (AUB): ICD-10-CM

## 2023-02-15 RX ORDER — LEVONORGESTREL AND ETHINYL ESTRADIOL 0.1-0.02MG
1 KIT ORAL DAILY
Qty: 90 TABLET | Refills: 0 | Status: SHIPPED | OUTPATIENT
Start: 2023-02-15

## 2023-05-05 ENCOUNTER — TELEPHONE (OUTPATIENT)
Dept: OBGYN CLINIC | Facility: CLINIC | Age: 23
End: 2023-05-05

## 2023-05-05 DIAGNOSIS — N93.9 ABNORMAL UTERINE BLEEDING (AUB): ICD-10-CM

## 2023-05-05 RX ORDER — LEVONORGESTREL AND ETHINYL ESTRADIOL 0.1-0.02MG
1 KIT ORAL DAILY
Qty: 28 TABLET | Refills: 0 | Status: CANCELLED | OUTPATIENT
Start: 2023-05-05

## 2023-05-09 DIAGNOSIS — N93.9 ABNORMAL UTERINE BLEEDING (AUB): ICD-10-CM

## 2023-05-09 RX ORDER — LEVONORGESTREL AND ETHINYL ESTRADIOL 0.1-0.02MG
KIT ORAL
Qty: 84 TABLET | Refills: 0 | Status: SHIPPED | OUTPATIENT
Start: 2023-05-09

## 2023-05-31 ENCOUNTER — ANNUAL EXAM (OUTPATIENT)
Dept: OBGYN CLINIC | Facility: CLINIC | Age: 23
End: 2023-05-31

## 2023-05-31 VITALS
DIASTOLIC BLOOD PRESSURE: 90 MMHG | HEIGHT: 64 IN | WEIGHT: 120 LBS | SYSTOLIC BLOOD PRESSURE: 128 MMHG | BODY MASS INDEX: 20.49 KG/M2

## 2023-05-31 DIAGNOSIS — Z12.4 ENCOUNTER FOR PAPANICOLAOU SMEAR FOR CERVICAL CANCER SCREENING: ICD-10-CM

## 2023-05-31 DIAGNOSIS — Z01.419 ENCOUNTER FOR ANNUAL ROUTINE GYNECOLOGICAL EXAMINATION: Primary | ICD-10-CM

## 2023-05-31 PROCEDURE — G0145 SCR C/V CYTO,THINLAYER,RESCR: HCPCS | Performed by: STUDENT IN AN ORGANIZED HEALTH CARE EDUCATION/TRAINING PROGRAM

## 2023-05-31 NOTE — PROGRESS NOTES
"Assessment/Plan:    26 yo G0 - annual exam    Problem List Items Addressed This Visit    None  Visit Diagnoses     Encounter for annual routine gynecological examination    -  Primary  First pap today  Continue OCP  F/u in 1 year or prn    Encounter for Papanicolaou smear for cervical cancer screening        Relevant Orders    Liquid-based pap, screening                 Subjective:      Patient ID:     This is a 25 y o   with Patient's last menstrual period was 2023 (approximate)  Doing well on new pill  Thinks it may have caused 5 lb weight gain but she's otherwise happy with it  Working on diet and exercise  Contraception: OCP  Periods: regular, normal amount and duration  Sexually active: no  STD testing: no  No bladder concerns     Screening:  Last pap smear: first today  Gardisil vaccine: thinks she completed     Family history:   Breast cancer: no  Ovarian cancer: no  Colon cancer: no    Body mass index is 20 76 kg/m²  Exercise: Yes  Diet: healthy    Mood concerns: none, well controlled  The following portions of the patient's history were reviewed and updated as appropriate: allergies, current medications, past family history, past medical history, past social history, past surgical history and problem list     Review of Systems   Constitutional: Negative  HENT: Negative  Eyes: Negative  Respiratory: Negative  Cardiovascular: Negative  Gastrointestinal: Negative  Endocrine: Negative  Genitourinary: Negative for dyspareunia, dysuria, frequency, menstrual problem, pelvic pain, vaginal discharge and vaginal pain  Musculoskeletal: Negative  Skin: Negative  Allergic/Immunologic: Negative  Neurological: Negative  Hematological: Negative  Psychiatric/Behavioral: Negative            Objective:      /90 (BP Location: Left arm, Patient Position: Sitting, Cuff Size: Standard)   Ht 5' 3 75\" (1 619 m)   Wt 54 4 kg (120 lb)   LMP 2023 (Approximate)  " BMI 20 76 kg/m²          Physical Exam  Vitals reviewed  Cardiovascular:      Rate and Rhythm: Normal rate  Pulmonary:      Effort: Pulmonary effort is normal    Chest:   Breasts:     Breasts are symmetrical       Right: No mass, nipple discharge, skin change or tenderness  Left: No mass, nipple discharge, skin change or tenderness  Abdominal:      Palpations: Abdomen is soft  Genitourinary:     Labia:         Right: No rash  Left: No rash  Vagina: Normal  No signs of injury  Cervix: No cervical motion tenderness, discharge, friability, lesion or cervical bleeding  Uterus: Not deviated, not enlarged, not fixed and not tender  Adnexa:         Right: No mass, tenderness or fullness  Left: No mass, tenderness or fullness  Musculoskeletal:      Cervical back: Normal range of motion  Skin:     General: Skin is warm and dry  Neurological:      Mental Status: She is alert and oriented to person, place, and time     Psychiatric:         Behavior: Behavior normal

## 2023-06-09 LAB
LAB AP GYN PRIMARY INTERPRETATION: NORMAL
Lab: NORMAL

## 2023-06-12 ENCOUNTER — TELEPHONE (OUTPATIENT)
Dept: OBGYN CLINIC | Facility: CLINIC | Age: 23
End: 2023-06-12

## 2023-07-05 ENCOUNTER — OFFICE VISIT (OUTPATIENT)
Dept: FAMILY MEDICINE CLINIC | Facility: CLINIC | Age: 23
End: 2023-07-05
Payer: COMMERCIAL

## 2023-07-05 VITALS
TEMPERATURE: 97.9 F | RESPIRATION RATE: 16 BRPM | BODY MASS INDEX: 20.49 KG/M2 | HEIGHT: 64 IN | DIASTOLIC BLOOD PRESSURE: 70 MMHG | OXYGEN SATURATION: 98 % | WEIGHT: 120 LBS | SYSTOLIC BLOOD PRESSURE: 100 MMHG | HEART RATE: 95 BPM

## 2023-07-05 DIAGNOSIS — I45.6 WOLFF-PARKINSON-WHITE (WPW) PATTERN: ICD-10-CM

## 2023-07-05 DIAGNOSIS — I34.1 MITRAL VALVE PROLAPSE: Primary | ICD-10-CM

## 2023-07-05 PROBLEM — Q22.5 EBSTEIN'S ANOMALY: Status: ACTIVE | Noted: 2017-02-27

## 2023-07-05 PROBLEM — R55 SYNCOPE: Status: ACTIVE | Noted: 2017-02-27

## 2023-07-05 PROCEDURE — 99213 OFFICE O/P EST LOW 20 MIN: CPT | Performed by: INTERNAL MEDICINE

## 2023-07-05 PROCEDURE — 3725F SCREEN DEPRESSION PERFORMED: CPT | Performed by: INTERNAL MEDICINE

## 2023-07-05 RX ORDER — MULTIVITAMIN
1 TABLET ORAL DAILY
COMMUNITY

## 2023-07-05 NOTE — PROGRESS NOTES
Name: Katiuska Perea      : 2000      MRN: [id number]  Encounter Provider: Tasia Ryan MD  Encounter Date: 2023   Encounter department: [facility]     1. Mitral valve prolapse  Assessment & Plan:  Birth she was found to have a mitral problem and follows with cardiology regularly but has had no difficulties      2. Ian-Parkinson-White (WPW) pattern  Assessment & Plan:  She had some procedure done when she was 16 and has had no arrhythmias since        Depression Screening and Follow-up Plan: Patient was screened for depression during today's encounter. They screened negative with a PHQ-2 score of 0. Subjective      Patient actually is currently having no problems. However she needs a form signed for her to go to [facility] on a trip. Her only real health problem has been a mitral valve disorder Ian-Parkinson-White for which she had a procedure done when she was 16 and has had no problems since. He continues to follow with her cardiologist and actually talk to them within the week    Review of Systems   Constitutional: Negative for chills and fever. HENT: Negative for ear pain and sore throat. Eyes: Negative for pain and visual disturbance. Respiratory: Negative for cough and shortness of breath. Cardiovascular: Negative for chest pain and palpitations. Gastrointestinal: Negative for abdominal pain and vomiting. Genitourinary: Negative for dysuria and hematuria. Musculoskeletal: Negative for arthralgias and back pain. Skin: Negative for color change and rash. Neurological: Negative for seizures and syncope. All other systems reviewed and are negative.       Current Outpatient Medications on File Prior to Visit   Medication Sig   • fluticasone (FLONASE) 50 mcg/act nasal spray SPRAY 1 SPRAY INTO EACH NOSTRIL EVERY DAY   • Multiple Vitamin (multivitamin) tablet Take 1 tablet by mouth daily   • Multiple Vitamins-Minerals (AIRBORNE PO) Take by mouth   • Sronyx 0.1-20 MG-MCG per tablet TAKE 1 TABLET BY MOUTH EVERY DAY   • amoxicillin (AMOXIL) 500 mg capsule TAKE 1 CAPSULE BY MOUTH EVERY 8 HOURS FOR 7 DAYS (Patient not taking: Reported on 5/31/2023)   • [DISCONTINUED] ibuprofen (MOTRIN) 600 mg tablet TAKE 1 TABLET BY MOUTH EVERY 6 HOURS AS NEEDED FOR MILD PAIN CONTROL (Patient not taking: Reported on 5/31/2023)   • [DISCONTINUED] sodium chloride (OCEAN) 0.65 % nasal spray 1 spray into each nostril 2 (two) times a day       Objective     /70 (BP Location: Right arm, Patient Position: Sitting, Cuff Size: Standard)   Pulse 95   Temp 97.9 °F (36.6 °C) (Temporal)   Resp 16   Ht 5' 3.5" (1.613 m)   Wt 54.4 kg (120 lb)   LMP 05/23/2023 (Approximate)   SpO2 98%   BMI 20.92 kg/m²     Physical Exam  Constitutional:       General: She is not in acute distress. Appearance: She is well-developed. HENT:      Right Ear: External ear normal.      Left Ear: External ear normal.      Nose: Nose normal.      Mouth/Throat:      Pharynx: No oropharyngeal exudate. Eyes:      Pupils: Pupils are equal, round, and reactive to light. Neck:      Thyroid: No thyromegaly. Vascular: No JVD. Cardiovascular:      Rate and Rhythm: Normal rate and regular rhythm. Heart sounds: Normal heart sounds. No murmur heard. No gallop. Pulmonary:      Effort: Pulmonary effort is normal. No respiratory distress. Breath sounds: Normal breath sounds. No wheezing or rales. Abdominal:      General: Bowel sounds are normal. There is no distension. Palpations: Abdomen is soft. There is no mass. Tenderness: There is no abdominal tenderness. Musculoskeletal:         General: No tenderness. Normal range of motion. Cervical back: Normal range of motion and neck supple. Lymphadenopathy:      Cervical: No cervical adenopathy. Skin:     Findings: No rash. Neurological:      Mental Status: She is alert and oriented to person, place, and time. Cranial Nerves: No cranial nerve deficit. Coordination: Coordination normal.   Psychiatric:         Behavior: Behavior normal.         Thought Content:  Thought content normal.         Judgment: Judgment normal.       Lei Mejia MD

## 2023-07-05 NOTE — ASSESSMENT & PLAN NOTE
Birth she was found to have a mitral problem and follows with cardiology regularly but has had no difficulties

## 2023-08-05 DIAGNOSIS — N93.9 ABNORMAL UTERINE BLEEDING (AUB): ICD-10-CM

## 2023-08-06 RX ORDER — LEVONORGESTREL AND ETHINYL ESTRADIOL 0.1-0.02MG
KIT ORAL
Qty: 84 TABLET | Refills: 2 | Status: SHIPPED | OUTPATIENT
Start: 2023-08-06

## 2024-02-21 PROBLEM — Z13.29 SCREENING FOR THYROID DISORDER: Status: RESOLVED | Noted: 2019-09-12 | Resolved: 2024-02-21

## 2024-02-21 PROBLEM — Z13.220 SCREENING FOR HYPERLIPIDEMIA: Status: RESOLVED | Noted: 2019-09-12 | Resolved: 2024-02-21

## 2024-04-22 DIAGNOSIS — N93.9 ABNORMAL UTERINE BLEEDING (AUB): ICD-10-CM

## 2024-04-22 RX ORDER — LEVONORGESTREL AND ETHINYL ESTRADIOL 0.1-0.02MG
KIT ORAL
Qty: 84 TABLET | Refills: 1 | Status: SHIPPED | OUTPATIENT
Start: 2024-04-22

## 2024-08-14 ENCOUNTER — TELEPHONE (OUTPATIENT)
Dept: FAMILY MEDICINE CLINIC | Facility: CLINIC | Age: 24
End: 2024-08-14

## 2024-08-14 NOTE — TELEPHONE ENCOUNTER
----- Message from Nola PEREZ sent at 8/13/2024  6:47 AM EDT -----  Regarding: APPT  Patient due for appointment, contact to schedule or let me know to update PCP.

## 2024-10-09 ENCOUNTER — OFFICE VISIT (OUTPATIENT)
Age: 24
End: 2024-10-09
Payer: COMMERCIAL

## 2024-10-09 VITALS
OXYGEN SATURATION: 98 % | DIASTOLIC BLOOD PRESSURE: 84 MMHG | WEIGHT: 123.8 LBS | SYSTOLIC BLOOD PRESSURE: 114 MMHG | TEMPERATURE: 98.4 F | HEART RATE: 109 BPM | RESPIRATION RATE: 18 BRPM | BODY MASS INDEX: 21.59 KG/M2

## 2024-10-09 DIAGNOSIS — J06.9 URI WITH COUGH AND CONGESTION: Primary | ICD-10-CM

## 2024-10-09 PROCEDURE — 99213 OFFICE O/P EST LOW 20 MIN: CPT | Performed by: PHYSICIAN ASSISTANT

## 2024-10-09 RX ORDER — GUAIFENESIN, PSEUDOEPHEDRINE HYDROCHLORIDE 600; 60 MG/1; MG/1
1 TABLET, EXTENDED RELEASE ORAL EVERY 12 HOURS
Start: 2024-10-09

## 2024-10-09 NOTE — PATIENT INSTRUCTIONS
"Patient Education     Cough in adults   The Basics   Written by the doctors and editors at [facility]   What is a cough? -- A cough is an important reflex that helps clear out the body's airways. The airways include the windpipe, or \"trachea,\" and the bronchi, which are the tubes that carry air within the lungs. Coughing helps keep people from breathing things into the airways and lungs, which could cause problems (figure 1).  It is normal for people to cough once in a while. But sometimes, a cough is a symptom of an illness or condition.  Some coughs are called \"dry\" coughs, because they don't bring up mucus (phlegm). Other coughs are called \"wet\" or \"productive\" coughs, because they do bring up mucus. Some coughs are mild and don't cause serious problems. Other coughs are severe and can cause trouble breathing.  What causes a cough? -- In adults, common causes of a cough include:   Viral infections - These include the common cold, the flu, and COVID-19. Usually, a cough caused by a viral infection will only last for a week or 2, but sometimes, it can last longer.   Smoking cigarettes or vaping   Postnasal drip - Postnasal drip is when mucus from the nose drips down or flows along the back of the throat. Postnasal drip can happen when people have:   A cold   Allergies   A sinus infection   Lung conditions - Lung problems like asthma and chronic obstructive pulmonary disease (\"COPD\") can make it hard to breathe. COPD is usually caused by smoking.   Acid reflux - Acid reflux is when the acid that is normally in your stomach backs up into your esophagus (the tube that carries food from your mouth to your stomach).   A side effect from blood pressure medicines called \"ACE inhibitors\"  Will I need tests? -- Maybe. If you see a doctor for your cough, they will talk with you and do an exam. Based on your symptoms and other factors, they might decide that you need tests. These might include:   A swab from your inside of your " "nose - This can be tested for the virus that causes COVID-19.   A chest X-ray   Breathing tests - Breathing tests involve breathing hard into a tube. These tests show how your lungs are working.   Allergy skin tests to find out what you're allergic to - For a skin test, the doctor puts a drop of the substance that you might be allergic to on your skin and makes a tiny prick in your skin. Then, they watch your skin to see if it gets red and bumpy.   A CT scan of your chest or sinuses - A CT scan is an imaging test that creates pictures of the inside of the body.   Lab tests on a sample of the mucus that you cough up   Using a \"scope\" to look inside of your nose, sinuses, airway, or lungs   Tests to check for acid reflux - These usually involve having a thin tube put in your mouth and down into your esophagus.  How can I care for myself at home?    If your cough is from a cold, you can use a cool mist humidifier in your bedroom.   Suck on cough drops or hard candy.   Drink warm liquids, like tea, to soothe your throat.   Avoid smoking and places where other people are smoking.   If you have allergies, avoid the things that you are allergic to. This might include pollen, dust, animals, or mold.   If you are coughing up mucus, try an over-the-counter cold and cough medicine. These medicines can thin mucus and sometimes reduce the urge to cough.   If you have acid reflux, your doctor or nurse will talk to you about how to reduce symptoms.  How is a cough treated? -- Treatment depends on the cause of your cough. For example:   Some infections are treated with antibiotic medicines. If an infection is caused by bacteria, doctors can treat it with antibiotics. If the infection is caused by a virus (such as the common cold), antibiotics will not help. For some viral infections, like the flu or COVID-19, there might be other medicines that can help.   Postnasal drip is treated with different kinds of medicines that can come as " "a pill or nose spray.   Asthma and COPD are usually treated with medicines that people breathe into their lungs. These are called \"inhaler medicines.\"   Acid reflux can be treated with medicine to reduce or block stomach acid.   If you have a cough as a side effect from an ACE inhibitor, your doctor can switch your medicine.  If the cause of your cough is not clear, your doctor might prescribe medicine to make your cough less severe. But these medicines have side effects, and doctors usually recommend them only if nothing else has worked.  When should I call the doctor? -- Call your doctor or nurse if:   You have trouble breathing or noisy breathing (wheezing).   You have a fever or chest pain.   You cough up blood, or yellow or green mucus.   You cough so hard that it makes you throw up.   Your cough gets worse or lasts longer than 14 days.   You have a cough and have lost weight without trying to.  All topics are updated as new evidence becomes available and our peer review process is complete.  This topic retrieved from [employer] on: Feb 26, 2024.  Topic 72940 Version 16.0  Release: 32.2.4 - C32.56  © 2024 UpToDate, Inc. and/or its affiliates. All rights reserved.  figure 1: Normal lungs     The lungs sit in the chest, inside the ribcage. They are covered with a thin membrane called the \"pleura.\" The windpipe, or trachea, branches into 2 smaller airways called the left and right \"bronchi.\" The space between the lungs is called the \"mediastinum.\" Lymph nodes are located within and around the lungs and mediastinum.  Graphic 91720 Version 14.0  Consumer Information Use and Disclaimer   Disclaimer: This generalized information is a limited summary of diagnosis, treatment, and/or medication information. It is not meant to be comprehensive and should be used as a tool to help the user understand and/or assess potential diagnostic and treatment options. It does NOT include all information about conditions, treatments, " medications, side effects, or risks that may apply to a specific patient. It is not intended to be medical advice or a substitute for the medical advice, diagnosis, or treatment of a health care provider based on the health care provider's examination and assessment of a patient's specific and unique circumstances. Patients must speak with a health care provider for complete information about their health, medical questions, and treatment options, including any risks or benefits regarding use of medications. This information does not endorse any treatments or medications as safe, effective, or approved for treating a specific patient. UpToDate, Inc. and its affiliates disclaim any warranty or liability relating to this information or the use thereof.The use of this information is governed by the Terms of Use, available at https://www.[employer]ationsuwer.com/en/know/clinical-effectiveness-terms. 2024© UpToDate, Inc. and its affiliates and/or licensors. All rights reserved.  Copyright   © 2024 UpToDate, Inc. and/or its affiliates. All rights reserved.

## 2024-10-09 NOTE — PROGRESS NOTES
[facility]        NAME: Gloria Pantoja is a 24 y.o. female  : 2000    MRN: [id number]  DATE: 2024  TIME: 4:07 PM    Assessment and Plan   URI with cough and congestion [J06.9]  1. URI with cough and congestion  pseudoephedrine-guaifenesin (MUCINEX D)  MG per tablet            Patient Instructions     Umcka as directed  Zicam nasal spray  Increase fluid intake and remain well-hydrated  Rest    Follow up with PCP in 3-5 days.  Proceed to  ER if symptoms worsen.    Chief Complaint     Chief Complaint   Patient presents with    Cough     Chest congestion, cough X several days         History of Present Illness       Cough  This is a new problem. The current episode started in the past 7 days. The problem has been gradually worsening. The problem occurs every few hours. The cough is Productive of sputum. Associated symptoms include headaches and rhinorrhea. Pertinent negatives include no chest pain, chills, ear congestion, ear pain, fever, hemoptysis, myalgias, nasal congestion, postnasal drip, rash, sore throat, shortness of breath, sweats, weight loss or wheezing. The symptoms are aggravated by lying down. She has tried nothing for the symptoms. The treatment provided no relief. There is no history of asthma, bronchitis, COPD, emphysema or pneumonia.       Review of Systems   Review of Systems   Constitutional:  Negative for activity change, appetite change, chills, fatigue, fever and weight loss.   HENT:  Positive for rhinorrhea. Negative for congestion, ear pain, postnasal drip and sore throat.    Respiratory:  Positive for cough. Negative for hemoptysis, shortness of breath and wheezing.    Cardiovascular:  Negative for chest pain.   Gastrointestinal:  Negative for diarrhea, nausea and vomiting.   Musculoskeletal:  Negative for myalgias.   Skin:  Negative for rash.   Neurological:  Positive for headaches.         Current Medications       Current Outpatient Medications:      levonorgestrel-ethinyl estradiol (Sronyx) 0.1-20 MG-MCG per tablet, TAKE 1 TABLET BY MOUTH EVERY DAY, Disp: 84 tablet, Rfl: 1    Multiple Vitamin (multivitamin) tablet, Take 1 tablet by mouth daily, Disp: , Rfl:     Multiple Vitamins-Minerals (AIRBORNE PO), Take by mouth, Disp: , Rfl:     pseudoephedrine-guaifenesin (MUCINEX D)  MG per tablet, Take 1 tablet by mouth every 12 (twelve) hours, Disp: , Rfl:     amoxicillin (AMOXIL) 500 mg capsule, TAKE 1 CAPSULE BY MOUTH EVERY 8 HOURS FOR 7 DAYS (Patient not taking: Reported on 5/31/2023), Disp: , Rfl:     fluticasone (FLONASE) 50 mcg/act nasal spray, SPRAY 1 SPRAY INTO EACH NOSTRIL EVERY DAY (Patient not taking: Reported on 10/9/2024), Disp: 16 mL, Rfl: 1    Current Allergies     Allergies as of 10/09/2024 - Reviewed 10/09/2024   Allergen Reaction Noted    Bee venom Swelling 09/12/2019    Shellfish-derived products - food allergy  10/11/2017            The following portions of the patient's history were reviewed and updated as appropriate: allergies, current medications, past family history, past medical history, past social history, past surgical history and problem list.     Past Medical History:   Diagnosis Date    Ian-Parkinson-White (WPW) syndrome        Past Surgical History:   Procedure Laterality Date    CARDIAC ELECTROPHYSIOLOGY MAPPING AND ABLATION      WISDOM TOOTH EXTRACTION  06/27/2022       Family History   Problem Relation Age of Onset    Hypertension Mother     No Known Problems Father     No Known Problems Sister     No Known Problems Sister     No Known Problems Sister     Diabetes Maternal Grandmother     Parkinsonism Maternal Grandmother     Lupus Maternal Grandmother     Diabetes Maternal Grandfather     No Known Problems Paternal Grandfather     Breast cancer Neg Hx     Ovarian cancer Neg Hx     Cervical cancer Neg Hx     Uterine cancer Neg Hx          Medications have been verified.        Objective   /84 (BP Location: Left arm,  Patient Position: Sitting, Cuff Size: Adult)   Pulse (!) 109   Temp 98.4 °F (36.9 °C) (Tympanic)   Resp 18   Wt 56.2 kg (123 lb 12.8 oz)   LMP 09/04/2024 Comment: Misshapen uterus prevent pregnancy  SpO2 98%   BMI 21.59 kg/m²        Physical Exam     Physical Exam  Vitals and nursing note reviewed.   Constitutional:       General: She is not in acute distress.     Appearance: Normal appearance. She is well-developed. She is not ill-appearing, toxic-appearing or diaphoretic.   HENT:      Head: Normocephalic and atraumatic.      Right Ear: Tympanic membrane, ear canal and external ear normal.      Left Ear: Tympanic membrane, ear canal and external ear normal.      Nose: Congestion and rhinorrhea present.      Mouth/Throat:      Mouth: Mucous membranes are moist. No oral lesions.      Pharynx: Oropharynx is clear. Posterior oropharyngeal erythema present. No oropharyngeal exudate.   Eyes:      General: No scleral icterus.     Extraocular Movements: Extraocular movements intact.      Right eye: Normal extraocular motion.      Left eye: Normal extraocular motion.      Conjunctiva/sclera: Conjunctivae normal.      Pupils: Pupils are equal, round, and reactive to light.   Cardiovascular:      Rate and Rhythm: Normal rate and regular rhythm.      Pulses: Normal pulses.      Heart sounds: Normal heart sounds.   Pulmonary:      Effort: Pulmonary effort is normal. No respiratory distress.      Breath sounds: Normal breath sounds. No wheezing, rhonchi or rales.   Musculoskeletal:         General: Normal range of motion.      Cervical back: Normal range of motion and neck supple. No tenderness.   Lymphadenopathy:      Cervical: No cervical adenopathy.   Skin:     General: Skin is warm and dry.      Capillary Refill: Capillary refill takes less than 2 seconds.      Findings: No rash.   Neurological:      General: No focal deficit present.      Mental Status: She is alert and oriented to person, place, and time.       Coordination: Coordination normal.      Gait: Gait normal.   Psychiatric:         Mood and Affect: Mood normal.         Behavior: Behavior normal.

## 2024-10-09 NOTE — LETTER
October 9, 2024     Patient: Gloria Pantoja   YOB: 2000   Date of Visit: 10/9/2024       To Whom it May Concern:    Gloria Pantoja was seen in my clinic on 10/9/2024. She may return to school on once fever free for 24 hrs without the use of fever reducing medications .    If you have any questions or concerns, please don't hesitate to call.         Sincerely,          Anton Francisco PA-C        CC: No Recipients

## 2024-10-20 DIAGNOSIS — N93.9 ABNORMAL UTERINE BLEEDING (AUB): ICD-10-CM

## 2024-10-21 RX ORDER — LEVONORGESTREL AND ETHINYL ESTRADIOL 0.1-0.02MG
KIT ORAL
Qty: 84 TABLET | Refills: 1 | Status: SHIPPED | OUTPATIENT
Start: 2024-10-21

## 2024-11-01 ENCOUNTER — TELEPHONE (OUTPATIENT)
Age: 24
End: 2024-11-01

## 2024-11-01 NOTE — TELEPHONE ENCOUNTER
Called patient in regards to her [employer] message about her appointment on 4/8/25 at 3:00 p.m. at the [facility] ENT office. No answer. Left a detailed voicemail with our call back phone number.

## 2025-02-04 ENCOUNTER — TELEPHONE (OUTPATIENT)
Dept: OBGYN CLINIC | Facility: CLINIC | Age: 25
End: 2025-02-04

## 2025-02-04 NOTE — TELEPHONE ENCOUNTER
Ms. Pantoja's annual exam is being rescheduled from February 19th to a consultation with Dr. Riley on April 3, 2025 @11:30 am in [place].  Ms. Pantoja will need 3 refills on her B/C Pills sent to the [facility] on [address] in [place].  Please advise.

## 2025-03-24 ENCOUNTER — TELEPHONE (OUTPATIENT)
Age: 25
End: 2025-03-24

## 2025-03-24 NOTE — TELEPHONE ENCOUNTER
"[facility] states [facility] [place] instead of Sronyx BCP. Reviewed with patient sometimes pharmacy will substitute if order is not marked \"Brand only\" as long as active ingredient are similar. Patient verbalzied understanding.    Asked patient if she wants brand only med prescribed.  Patient will check first if [facility] can [facility] Sronyx.       "

## 2025-03-31 DIAGNOSIS — N93.9 ABNORMAL UTERINE BLEEDING (AUB): ICD-10-CM

## 2025-03-31 NOTE — TELEPHONE ENCOUNTER
There is a telephone encounter from 3/24/2025.  Patient said she called pharmacy and [facility]. She would like to have that one reordered.  Thank you

## 2025-04-01 RX ORDER — LEVONORGESTREL/ETHIN.ESTRADIOL 0.1-0.02MG
1 TABLET ORAL DAILY
Qty: 84 TABLET | Refills: 1 | OUTPATIENT
Start: 2025-04-01

## 2025-04-08 DIAGNOSIS — N93.9 ABNORMAL UTERINE BLEEDING (AUB): ICD-10-CM

## 2025-04-09 RX ORDER — LEVONORGESTREL/ETHIN.ESTRADIOL 0.1-0.02MG
1 TABLET ORAL DAILY
Qty: 84 TABLET | Refills: 0 | OUTPATIENT
Start: 2025-04-09

## 2025-04-09 NOTE — TELEPHONE ENCOUNTER
[employer] message also sent to patient to schedule annual exam. Patient also has an appt scheduled on 07/09/2025 for a consultation Possible Marshall Wisdom. Annual exam needs to be a separate visit.

## 2025-04-09 NOTE — TELEPHONE ENCOUNTER
Left VM for patient to call back to schedule annual exam before refilling rx. Last annual exam was on 05/31/2023.

## 2025-04-10 NOTE — TELEPHONE ENCOUNTER
Left VM for patient's mom (per communication consent) to advise patient to call back in regards to rx.

## 2025-04-16 DIAGNOSIS — N93.9 ABNORMAL UTERINE BLEEDING (AUB): ICD-10-CM

## 2025-04-16 NOTE — TELEPHONE ENCOUNTER
Reason for call:   [] Refill   [] Prior Auth  [x] Other: pharmacy change    Office:   [] PCP/Provider -   [x] Specialty/Provider - Rahel Riley,     Medication: levonorgestrel-ethinyl estradiol (Sronyx) 0.1-20 MG-MCG     Dose/Frequency: : Take 1 tablet by mouth daily     Quantity: 84, 1 RF    Pharmacy: [facility]   Does the patient have enough for 3 days?   [x] Yes   [] No - Send as HP to POD    Mail Away Pharmacy   Does the patient have enough for 10 days?   [] Yes   [] No - Send as HP to POD

## 2025-04-17 RX ORDER — LEVONORGESTREL/ETHIN.ESTRADIOL 0.1-0.02MG
1 TABLET ORAL DAILY
Qty: 84 TABLET | Refills: 0 | OUTPATIENT
Start: 2025-04-17

## 2025-04-17 NOTE — TELEPHONE ENCOUNTER
Needs annual, not seen since 2023   I am not her current provider   See if we can get her in somewhere please

## 2025-04-26 ENCOUNTER — NURSE TRIAGE (OUTPATIENT)
Dept: OTHER | Facility: OTHER | Age: 25
End: 2025-04-26

## 2025-04-26 DIAGNOSIS — N93.9 ABNORMAL UTERINE BLEEDING (AUB): ICD-10-CM

## 2025-04-26 RX ORDER — LEVONORGESTREL/ETHIN.ESTRADIOL 0.1-0.02MG
1 TABLET ORAL DAILY
Qty: 28 TABLET | Refills: 0 | Status: SHIPPED | OUTPATIENT
Start: 2025-04-26 | End: 2025-04-30 | Stop reason: SDUPTHER

## 2025-04-26 NOTE — TELEPHONE ENCOUNTER
"Regarding: Headache/ Stomach ache  ----- Message from Sury LIZAMA sent at 4/26/2025  2:51 PM EDT -----  Pt stated, \" I ran out of my birth control, I put in a request for it to be filled and it  was never refilled. I currently have a stomach ache and a headache from not taking my medication.\"    Pt made aware birth control can not be refilled AH        levonorgestrel-ethinyl estradiol (Sronyx) 0.1-20 MG-MCG per tablet [[id number]]    Order Details    Dose: 1 tablet Route: Oral Frequency: Daily  Dispense Quantity: 84 tablet Refills: 1    "

## 2025-04-26 NOTE — TELEPHONE ENCOUNTER
"FOLLOW UP: Patient has appointment on 4/30    REASON FOR CONVERSATION: Medication Problem    SYMPTOMS: Headache    OTHER: Patient completely out of birth control; has appointment scheduled on 4/30. States she really doesn't want to stop taking it after being on it continuously for 3 years.    DISPOSITION: Call PCP Now    Per on-call provider, ok to send 30 day supply of medication. Please remind patient to keep appointments and advise she can't call on the weekend for routine medications going forward.    Reason for Disposition   [1] Caller has URGENT medicine question about med that PCP or specialist prescribed AND [2] triager unable to answer question    Answer Assessment - Initial Assessment Questions  1. NAME of MEDICINE: \"What medicine(s) are you calling about?\"        levonorgestrel-ethinyl estradiol (Sronyx) 0.1-20 MG-MCG per tablet    2. QUESTION: \"What is your question?\" (e.g., double dose of medicine, side effect)        [facility]    3. PRESCRIBER: \"Who prescribed the medicine?\" Reason: if prescribed by specialist, call should be referred to that group.        OBGYN    Patient is concerned about stopping medication; asking if she can have a prescription until she is seen.    Protocols used: Medication Question Call-Adult-    "

## 2025-04-30 ENCOUNTER — OFFICE VISIT (OUTPATIENT)
Dept: OBGYN CLINIC | Facility: CLINIC | Age: 25
End: 2025-04-30
Payer: COMMERCIAL

## 2025-04-30 ENCOUNTER — TELEPHONE (OUTPATIENT)
Dept: OBGYN CLINIC | Facility: CLINIC | Age: 25
End: 2025-04-30

## 2025-04-30 VITALS
BODY MASS INDEX: 22.71 KG/M2 | SYSTOLIC BLOOD PRESSURE: 116 MMHG | DIASTOLIC BLOOD PRESSURE: 72 MMHG | HEIGHT: 64 IN | WEIGHT: 133 LBS

## 2025-04-30 DIAGNOSIS — Z01.419 ENCOUNTER FOR ANNUAL ROUTINE GYNECOLOGICAL EXAMINATION: ICD-10-CM

## 2025-04-30 DIAGNOSIS — N93.9 ABNORMAL UTERINE BLEEDING (AUB): ICD-10-CM

## 2025-04-30 DIAGNOSIS — Q51.28 UTERINE SEPTUM: Primary | ICD-10-CM

## 2025-04-30 PROCEDURE — 99395 PREV VISIT EST AGE 18-39: CPT | Performed by: OBSTETRICS & GYNECOLOGY

## 2025-04-30 RX ORDER — LEVONORGESTREL/ETHIN.ESTRADIOL 0.1-0.02MG
1 TABLET ORAL DAILY
Qty: 84 TABLET | Refills: 3 | Status: SHIPPED | OUTPATIENT
Start: 2025-04-30

## 2025-04-30 NOTE — TELEPHONE ENCOUNTER
Called and left message regarding missed ENT appointment on 4/29/25.Stated that if she wished she could call back to reschedule missed appointment.

## 2025-04-30 NOTE — PROGRESS NOTES
A:  Yearly exam.     P:     - Pap up to date. Reviewed ASCCP guidelines for pap smear today.   - [facility] referral placed today for uterine septum repair  - OCPs refilled today   - declines STI testing  - diet and exercise reviewed  - gardasil reviewed    RTO in 1 year or sooner as needed.     CC:  Yearly exam    S:  24 y.o. female here for yearly exam.     Her cycles are regular, not heavy or crampy with using pills.  Pt with partial septate uterus. Previously referred to [facility] for repair. Wants referral again today.     Menarche: 14 years old.     LMP: 26  Contraception: OCPs, stopped due to running out  Last Pap: May 2023, NILM  Last Mammo: n/a  Last Colonoscopy: n/a    Non-smoker, social drinker, denies drug use  Exercises regularly, balanced diet    Finishing school at [employer] this month. 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  Wants to work with the elderly.     Sexual activity: She is not sexually active without pain, bleeding or dryness.   Last SA about 1 year ago- no issues.     STD testing:  She does not want STD testing today.     Gardasil:  She has had the Gardasil series.     Family hx of breast cancer: denies  Family hx of ovarian cancer: denies  Family hx of colon cancer:  denies      Current Outpatient Medications:     levonorgestrel-ethinyl estradiol (Sronyx) 0.1-20 MG-MCG per tablet, Take 1 tablet by mouth daily, Disp: 84 tablet, Rfl: 3    Multiple Vitamin (multivitamin) tablet, Take 1 tablet by mouth daily, Disp: , Rfl:   Social History     Socioeconomic History    Marital status: Single     Spouse name: Not on file    Number of children: Not on file    Years of education: Not on file    Highest education level: Some college, no degree   Occupational History    Not on file   Tobacco Use    Smoking status: Never    Smokeless tobacco: Never   Vaping Use    Vaping status: Never Used   Substance and Sexual Activity    Alcohol use: Not Currently    Drug use: Never    Sexual activity: Not  "Currently     Birth control/protection: None, Abstinence   Other Topics Concern    Not on file   Social History Narrative    Living with mom stepfather and 3 sisters     Social Drivers of Health     Financial Resource Strain: Not on file   Food Insecurity: Not on file   Transportation Needs: Not on file   Physical Activity: Not on file   Stress: Not on file   Social Connections: Not on file   Intimate Partner Violence: Not on file   Housing Stability: Not on file     Family History   Problem Relation Age of Onset    Hypertension Mother     No Known Problems Father     No Known Problems Sister     No Known Problems Sister     No Known Problems Sister     Diabetes Maternal Grandmother     Parkinsonism Maternal Grandmother     Lupus Maternal Grandmother     Diabetes Maternal Grandfather     No Known Problems Paternal Grandfather     Breast cancer Neg Hx     Ovarian cancer Neg Hx     Cervical cancer Neg Hx     Uterine cancer Neg Hx       Past Medical History:   Diagnosis Date    Ian-Parkinson-White (WPW) syndrome         Review of Systems   Respiratory: Negative.    Cardiovascular: Negative.    Gastrointestinal: Negative for constipation and diarrhea.   Genitourinary: Negative for difficulty urinating, pelvic pain, vaginal bleeding, vaginal discharge, itching or odor.    O:  Blood pressure 116/72, height 5' 3.5\" (1.613 m), weight 60.3 kg (133 lb), last menstrual period 04/16/2025.    Exam was chaperoned.   Patient appears well and is not in distress  Neck is supple without masses  Breasts are symmetrical without mass, tenderness, nipple discharge, skin changes or adenopathy.   Abdomen is soft and nontender without masses.   External genitals are normal without lesions or rashes.  Urethral meatus and urethra are normal  Bladder is normal to palpation  Vagina is normal without discharge or bleeding.   Cervix is normal without discharge or lesion.   Uterus is normal, mobile, nontender without palpable mass.  Adnexa are " normal, nontender, without palpable mass.   Rectum without abnormality

## 2025-08-07 DIAGNOSIS — N93.9 ABNORMAL UTERINE BLEEDING (AUB): ICD-10-CM

## 2025-08-08 RX ORDER — LEVONORGESTREL/ETHIN.ESTRADIOL 0.1-0.02MG
1 TABLET ORAL DAILY
Qty: 84 TABLET | Refills: 0 | OUTPATIENT
Start: 2025-08-08

## 2025-08-09 ENCOUNTER — TELEPHONE (OUTPATIENT)
Dept: OTHER | Facility: OTHER | Age: 25
End: 2025-08-09

## 2025-08-09 DIAGNOSIS — N93.9 ABNORMAL UTERINE BLEEDING (AUB): ICD-10-CM

## 2025-08-11 ENCOUNTER — TELEPHONE (OUTPATIENT)
Age: 25
End: 2025-08-11

## 2025-08-12 RX ORDER — LEVONORGESTREL AND ETHINYL ESTRADIOL 0.1-0.02MG
1 KIT ORAL DAILY
Qty: 84 TABLET | Refills: 3 | Status: SHIPPED | OUTPATIENT
Start: 2025-08-12

## 2025-08-12 RX ORDER — LEVONORGESTREL AND ETHINYL ESTRADIOL 0.1-0.02MG
1 KIT ORAL DAILY
Qty: 84 TABLET | Refills: 3 | Status: SHIPPED | OUTPATIENT
Start: 2025-08-12 | End: 2025-08-12 | Stop reason: SDUPTHER